# Patient Record
Sex: MALE | Race: WHITE | NOT HISPANIC OR LATINO | Employment: FULL TIME | URBAN - NONMETROPOLITAN AREA
[De-identification: names, ages, dates, MRNs, and addresses within clinical notes are randomized per-mention and may not be internally consistent; named-entity substitution may affect disease eponyms.]

---

## 2023-01-17 ENCOUNTER — APPOINTMENT (EMERGENCY)
Dept: CT IMAGING | Facility: HOSPITAL | Age: 28
End: 2023-01-17

## 2023-01-17 ENCOUNTER — HOSPITAL ENCOUNTER (EMERGENCY)
Facility: HOSPITAL | Age: 28
Discharge: HOME | End: 2023-01-17
Attending: EMERGENCY MEDICINE

## 2023-01-17 ENCOUNTER — APPOINTMENT (EMERGENCY)
Dept: ULTRASOUND IMAGING | Facility: HOSPITAL | Age: 28
End: 2023-01-17

## 2023-01-17 VITALS
TEMPERATURE: 97.5 F | HEIGHT: 72 IN | WEIGHT: 274 LBS | BODY MASS INDEX: 37.11 KG/M2 | RESPIRATION RATE: 18 BRPM | OXYGEN SATURATION: 98 % | SYSTOLIC BLOOD PRESSURE: 131 MMHG | HEART RATE: 76 BPM | DIASTOLIC BLOOD PRESSURE: 76 MMHG

## 2023-01-17 DIAGNOSIS — R79.0 LOW MAGNESIUM LEVEL: ICD-10-CM

## 2023-01-17 DIAGNOSIS — R10.9 FLANK PAIN: ICD-10-CM

## 2023-01-17 DIAGNOSIS — K80.20 GALLSTONES: ICD-10-CM

## 2023-01-17 DIAGNOSIS — U07.1 COVID-19 VIRUS INFECTION: Primary | ICD-10-CM

## 2023-01-17 LAB
ALBUMIN SERPL BCP-MCNC: 4.2 G/DL (ref 3.5–5)
ALP SERPL-CCNC: 71 U/L (ref 34–104)
ALT SERPL W P-5'-P-CCNC: 41 U/L (ref 7–52)
ANION GAP SERPL CALCULATED.3IONS-SCNC: 8 MMOL/L (ref 4–13)
AST SERPL W P-5'-P-CCNC: 25 U/L (ref 13–39)
BASOPHILS # BLD AUTO: 0.02 THOUSANDS/ÂΜL (ref 0–0.1)
BASOPHILS NFR BLD AUTO: 1 % (ref 0–1)
BILIRUB DIRECT SERPL-MCNC: 0.12 MG/DL (ref 0–0.2)
BILIRUB SERPL-MCNC: 0.4 MG/DL (ref 0.2–1)
BILIRUB UR QL STRIP: NEGATIVE
BUN SERPL-MCNC: 12 MG/DL (ref 5–25)
CALCIUM SERPL-MCNC: 8.8 MG/DL (ref 8.4–10.2)
CHLORIDE SERPL-SCNC: 104 MMOL/L (ref 96–108)
CLARITY UR: CLEAR
CO2 SERPL-SCNC: 28 MMOL/L (ref 21–32)
COLOR UR: YELLOW
CREAT SERPL-MCNC: 0.93 MG/DL (ref 0.6–1.3)
EOSINOPHIL # BLD AUTO: 0.05 THOUSAND/ÂΜL (ref 0–0.61)
EOSINOPHIL NFR BLD AUTO: 1 % (ref 0–6)
ERYTHROCYTE [DISTWIDTH] IN BLOOD BY AUTOMATED COUNT: 12 % (ref 11.6–15.1)
FLUAV RNA RESP QL NAA+PROBE: NEGATIVE
FLUBV RNA RESP QL NAA+PROBE: NEGATIVE
GFR SERPL CREATININE-BSD FRML MDRD: 112 ML/MIN/1.73SQ M
GLUCOSE SERPL-MCNC: 87 MG/DL (ref 65–140)
GLUCOSE UR STRIP-MCNC: NEGATIVE MG/DL
HCT VFR BLD AUTO: 46.7 % (ref 36.5–49.3)
HGB BLD-MCNC: 16.1 G/DL (ref 12–17)
HGB UR QL STRIP.AUTO: NEGATIVE
IMM GRANULOCYTES # BLD AUTO: 0.01 THOUSAND/UL (ref 0–0.2)
IMM GRANULOCYTES NFR BLD AUTO: 0 % (ref 0–2)
KETONES UR STRIP-MCNC: NEGATIVE MG/DL
LACTATE SERPL-SCNC: 0.7 MMOL/L (ref 0.5–2)
LEUKOCYTE ESTERASE UR QL STRIP: NEGATIVE
LIPASE SERPL-CCNC: 17 U/L (ref 11–82)
LYMPHOCYTES # BLD AUTO: 1.46 THOUSANDS/ÂΜL (ref 0.6–4.47)
LYMPHOCYTES NFR BLD AUTO: 40 % (ref 14–44)
MAGNESIUM SERPL-MCNC: 1.8 MG/DL (ref 1.9–2.7)
MCH RBC QN AUTO: 31.1 PG (ref 26.8–34.3)
MCHC RBC AUTO-ENTMCNC: 34.5 G/DL (ref 31.4–37.4)
MCV RBC AUTO: 90 FL (ref 82–98)
MONOCYTES # BLD AUTO: 0.41 THOUSAND/ÂΜL (ref 0.17–1.22)
MONOCYTES NFR BLD AUTO: 11 % (ref 4–12)
NEUTROPHILS # BLD AUTO: 1.75 THOUSANDS/ÂΜL (ref 1.85–7.62)
NEUTS SEG NFR BLD AUTO: 47 % (ref 43–75)
NITRITE UR QL STRIP: NEGATIVE
NRBC BLD AUTO-RTO: 0 /100 WBCS
PH UR STRIP.AUTO: 6 [PH]
PLATELET # BLD AUTO: 168 THOUSANDS/UL (ref 149–390)
PMV BLD AUTO: 10.4 FL (ref 8.9–12.7)
POTASSIUM SERPL-SCNC: 3.7 MMOL/L (ref 3.5–5.3)
PROCALCITONIN SERPL-MCNC: <0.05 NG/ML
PROT SERPL-MCNC: 6.7 G/DL (ref 6.4–8.4)
PROT UR STRIP-MCNC: NEGATIVE MG/DL
RBC # BLD AUTO: 5.18 MILLION/UL (ref 3.88–5.62)
RSV RNA RESP QL NAA+PROBE: NEGATIVE
SARS-COV-2 RNA RESP QL NAA+PROBE: POSITIVE
SODIUM SERPL-SCNC: 140 MMOL/L (ref 135–147)
SP GR UR STRIP.AUTO: <=1.005 (ref 1–1.03)
UROBILINOGEN UR QL STRIP.AUTO: 0.2 E.U./DL
WBC # BLD AUTO: 3.7 THOUSAND/UL (ref 4.31–10.16)

## 2023-01-17 RX ORDER — MAGNESIUM OXIDE 400 MG/1
400 TABLET ORAL DAILY
Qty: 20 TABLET | Refills: 0 | Status: SHIPPED | OUTPATIENT
Start: 2023-01-17 | End: 2023-02-06

## 2023-01-17 RX ORDER — ONDANSETRON 4 MG/1
4 TABLET, ORALLY DISINTEGRATING ORAL EVERY 8 HOURS PRN
Qty: 20 TABLET | Refills: 0 | Status: SHIPPED | OUTPATIENT
Start: 2023-01-17

## 2023-01-17 RX ORDER — NIRMATRELVIR AND RITONAVIR 300-100 MG
3 KIT ORAL 2 TIMES DAILY
Qty: 30 TABLET | Refills: 0 | Status: SHIPPED | OUTPATIENT
Start: 2023-01-17 | End: 2023-01-22

## 2023-01-17 RX ORDER — ONDANSETRON 2 MG/ML
4 INJECTION INTRAMUSCULAR; INTRAVENOUS ONCE
Status: DISCONTINUED | OUTPATIENT
Start: 2023-01-17 | End: 2023-01-18 | Stop reason: HOSPADM

## 2023-01-17 RX ADMIN — IOHEXOL 100 ML: 350 INJECTION, SOLUTION INTRAVENOUS at 20:56

## 2023-01-17 RX ADMIN — SODIUM CHLORIDE, SODIUM LACTATE, POTASSIUM CHLORIDE, AND CALCIUM CHLORIDE 1000 ML: .6; .31; .03; .02 INJECTION, SOLUTION INTRAVENOUS at 19:53

## 2023-01-17 NOTE — Clinical Note
Ronaldo Solitario was seen and treated in our emergency department on 1/17/2023  Diagnosis:     Dayron Hooper  may return to work on return date  He may return on this date: 01/23/2023         If you have any questions or concerns, please don't hesitate to call        Myles Calhoun MD    ______________________________           _______________          _______________  Hospital Representative                              Date                                Time

## 2023-01-17 NOTE — Clinical Note
Alexandre Palomino was seen and treated in our emergency department on 1/17/2023  Diagnosis:     Eduar Johnson  may return to work on return date  He may return on this date: 01/23/2023         If you have any questions or concerns, please don't hesitate to call        Radha Mcgregor MD    ______________________________           _______________          _______________  Hospital Representative                              Date                                Time

## 2023-01-18 NOTE — ED PROVIDER NOTES
History  Chief Complaint   Patient presents with   • Flank Pain     Patient reports b/l flank pain beginning yesterday with left being worse  Currently taking antibiotic due to tooth extraction  Also reports nausea and very dark stool  Denies any abdominal pain     32year-old otherwise healthy o/w male presents with left greater than right flank pain which is reminiscent of bilateral pyelonephritis which required hospitalization approximately 3 years ago  Denies any urinary complaints such as hematuria frequency dysuria urgency or gross hematuria has no prior history of kidney stones  Patient states symptoms darted 2 days ago was accompanied by feeling hot and cold sweating and freezing at the same time but no documented fever diffuse myalgias runny nose allergies all over since localized to the left flank region he denies chest pain shortness of breath or pleuritic pain no prior history of PE or DVT  Right-sided CVA tenderness has resolved and persists on the left denies lightheadedness he has been on antibiotic amoxicillin for a tooth extraction denies facial swelling difficulty swallowing or voice change no oral bleeding  There is been no sick contacts  He did test for COVID twice and that was positive no testicular pain or swelling or history of hernias no abdominal pain more fatigued and took naps which is unusual for him  Noted some darker type stools been a little bit runny  None       History reviewed  No pertinent past medical history  Past Surgical History:   Procedure Laterality Date   • TOOTH EXTRACTION         History reviewed  No pertinent family history  I have reviewed and agree with the history as documented      E-Cigarette/Vaping     E-Cigarette/Vaping Substances     Social History     Tobacco Use   • Smoking status: Every Day     Packs/day: 0 50     Types: Cigarettes   • Smokeless tobacco: Never   Substance Use Topics   • Alcohol use: Never   • Drug use: Never       Review of Systems   Constitutional: Positive for activity change, appetite change, chills and fatigue  Negative for fever  HENT: Negative for congestion, dental problem, ear pain, rhinorrhea, sneezing, sore throat and trouble swallowing  Eyes: Negative for discharge  Respiratory: Negative for cough and shortness of breath  Cardiovascular: Negative for chest pain and leg swelling  Gastrointestinal: Positive for abdominal pain, diarrhea and nausea  Negative for anal bleeding, blood in stool and vomiting  Endocrine: Negative for polyuria  Genitourinary: Positive for flank pain  Negative for decreased urine volume, difficulty urinating, dysuria, frequency, hematuria, penile discharge, penile pain, testicular pain and urgency  Musculoskeletal: Positive for myalgias  Negative for back pain  Skin: Negative for rash  Neurological: Negative for dizziness, weakness, light-headedness, numbness and headaches  Hematological: Negative for adenopathy  Psychiatric/Behavioral: Negative for confusion  All other systems reviewed and are negative  Physical Exam  Physical Exam  Vitals and nursing note reviewed  Constitutional:       General: He is not in acute distress  Appearance: He is not ill-appearing, toxic-appearing or diaphoretic  HENT:      Head: Normocephalic  Right Ear: Tympanic membrane normal  There is no impacted cerumen  Left Ear: Tympanic membrane normal  There is no impacted cerumen  Nose: No congestion or rhinorrhea  Mouth/Throat:      Mouth: Mucous membranes are moist       Pharynx: No oropharyngeal exudate  Comments: Left posterior mandibular molar was extracted there is no evidence of dry socket or any inflammation there is no submandibular tenderness no trismus facial tenderness cellulitis or induration  Eyes:      General:         Right eye: No discharge  Left eye: No discharge  Extraocular Movements: Extraocular movements intact  Conjunctiva/sclera: Conjunctivae normal       Pupils: Pupils are equal, round, and reactive to light  Cardiovascular:      Rate and Rhythm: Normal rate and regular rhythm  Pulses: Normal pulses  Pulmonary:      Effort: Pulmonary effort is normal  No respiratory distress  Breath sounds: No stridor  No wheezing, rhonchi or rales  Chest:      Chest wall: No tenderness  Abdominal:      General: Bowel sounds are normal  There is no distension  Tenderness: There is no abdominal tenderness  There is no right CVA tenderness, left CVA tenderness, guarding or rebound  Comments: Points to left flank as to the area of discomfort but he has no direct CVA tenderness   Genitourinary:     Comments: Patient defers rectal exam  Musculoskeletal:      Cervical back: Normal range of motion and neck supple  No rigidity or tenderness  Lymphadenopathy:      Cervical: No cervical adenopathy  Skin:     General: Skin is warm and dry  Capillary Refill: Capillary refill takes less than 2 seconds  Findings: No rash  Neurological:      General: No focal deficit present  Mental Status: He is alert and oriented to person, place, and time  Cranial Nerves: No cranial nerve deficit  Sensory: No sensory deficit  Motor: No weakness        Coordination: Coordination normal       Gait: Gait normal    Psychiatric:         Mood and Affect: Mood normal          Vital Signs  ED Triage Vitals   Temperature Pulse Respirations Blood Pressure SpO2   01/17/23 1954 01/17/23 1826 01/17/23 1826 01/17/23 1826 01/17/23 1826   97 5 °F (36 4 °C) 76 18 131/76 98 %      Temp Source Heart Rate Source Patient Position - Orthostatic VS BP Location FiO2 (%)   01/17/23 1954 01/17/23 1826 01/17/23 1826 01/17/23 1826 --   Temporal Monitor Sitting Left arm       Pain Score       01/17/23 1826       8           Vitals:    01/17/23 1826   BP: 131/76   Pulse: 76   Patient Position - Orthostatic VS: Sitting         Visual Acuity      ED Medications  Medications   ondansetron (ZOFRAN) injection 4 mg (0 mg Intravenous Hold 1/17/23 1955)   lactated ringers bolus 1,000 mL (0 mL Intravenous Stopped 1/17/23 2040)   iohexol (OMNIPAQUE) 350 MG/ML injection (SINGLE-DOSE) 100 mL (100 mL Intravenous Given 1/17/23 2056)       Diagnostic Studies  Results Reviewed     Procedure Component Value Units Date/Time    FLU/RSV/COVID - if FLU/RSV clinically relevant [[id number]]  (Abnormal) Collected: 01/17/23 1956    Lab Status: Final result Specimen: Nares from Nose Updated: 01/17/23 2052     SARS-CoV-2 Positive     INFLUENZA A PCR Negative     INFLUENZA B PCR Negative     RSV PCR Negative    Narrative:      FOR PEDIATRIC PATIENTS - copy/paste COVID Guidelines URL to browser: [email or web address]    SARS-CoV-2 assay is a Nucleic Acid Amplification assay intended for the  qualitative detection of nucleic acid from SARS-CoV-2 in nasopharyngeal  swabs  Results are for the presumptive identification of SARS-CoV-2 RNA  Positive results are indicative of infection with SARS-CoV-2, the virus  causing COVID-19, but do not rule out bacterial infection or co-infection  with other viruses  Laboratories within the [place] and its  territories are required to report all positive results to the appropriate  public health authorities  Negative results do not preclude SARS-CoV-2  infection and should not be used as the sole basis for treatment or other  patient management decisions  Negative results must be combined with  clinical observations, patient history, and epidemiological information  This test has not been FDA cleared or approved  This test has been authorized by FDA under an Emergency Use Authorization  (EUA)   This test is only authorized for the duration of time the  declaration that circumstances exist justifying the authorization of the  emergency use of an in vitro diagnostic tests for detection of SARS-CoV-2  virus and/or diagnosis of COVID-19 infection under section 564(b)(1) of  the Act, 21 U  S C  586YZB-5(U)(5), unless the authorization is terminated  or revoked sooner  The test has been validated but independent review by FDA  and CLIA is pending  Test performed using [employer] GeneXpert: This RT-PCR assay targets N2,  a region unique to SARS-CoV-2  A conserved region in the E-gene was chosen  for pan-Sarbecovirus detection which includes SARS-CoV-2  According to CMS-2020-01-R, this platform meets the definition of high-throughput technology  Procalcitonin [[id number]]  (Normal) Collected: 01/17/23 1956    Lab Status: Final result Specimen: Blood from Arm, Left Updated: 01/17/23 2038     Procalcitonin <0 05 ng/ml     Lactic acid [[id number]]  (Normal) Collected: 01/17/23 1956    Lab Status: Final result Specimen: Blood from Arm, Left Updated: 01/17/23 2030     LACTIC ACID 0 7 mmol/L     Narrative:      Result may be elevated if tourniquet was used during collection      Basic metabolic panel [[id number]] Collected: 01/17/23 1956    Lab Status: Final result Specimen: Blood from Arm, Left Updated: 01/17/23 2030     Sodium 140 mmol/L      Potassium 3 7 mmol/L      Chloride 104 mmol/L      CO2 28 mmol/L      ANION GAP 8 mmol/L      BUN 12 mg/dL      Creatinine 0 93 mg/dL      Glucose 87 mg/dL      Calcium 8 8 mg/dL      eGFR 112 ml/min/1 73sq m     Narrative:      [facility] guidelines for Chronic Kidney Disease (CKD):   •  Stage 1 with normal or high GFR (GFR > 90 mL/min/1 73 square meters)  •  Stage 2 Mild CKD (GFR = 60-89 mL/min/1 73 square meters)  •  Stage 3A Moderate CKD (GFR = 45-59 mL/min/1 73 square meters)  •  Stage 3B Moderate CKD (GFR = 30-44 mL/min/1 73 square meters)  •  Stage 4 Severe CKD (GFR = 15-29 mL/min/1 73 square meters)  •  Stage 5 End Stage CKD (GFR <15 mL/min/1 73 square meters)  Note: GFR calculation is accurate only with a steady state creatinine    Hepatic function panel [[id number]]  (Normal) Collected: 01/17/23 1956    Lab Status: Final result Specimen: Blood from Arm, Left Updated: 01/17/23 2030     Total Bilirubin 0 40 mg/dL      Bilirubin, Direct 0 12 mg/dL      Alkaline Phosphatase 71 U/L      AST 25 U/L      ALT 41 U/L      Total Protein 6 7 g/dL      Albumin 4 2 g/dL     Lipase [[id number]]  (Normal) Collected: 01/17/23 1956    Lab Status: Final result Specimen: Blood from Arm, Left Updated: 01/17/23 2030     Lipase 17 u/L     Magnesium [[id number]]  (Abnormal) Collected: 01/17/23 1956    Lab Status: Final result Specimen: Blood from Arm, Left Updated: 01/17/23 2030     Magnesium 1 8 mg/dL     UA w Reflex to Microscopic w Reflex to Culture [[id number]] Collected: 01/17/23 1956    Lab Status: Final result Specimen: Urine, Clean Catch Updated: 01/17/23 2013     Color, UA Yellow     Clarity, UA Clear     Specific Gravity, UA <=1 005     pH, UA 6 0     Leukocytes, UA Negative     Nitrite, UA Negative     Protein, UA Negative mg/dl      Glucose, UA Negative mg/dl      Ketones, UA Negative mg/dl      Urobilinogen, UA 0 2 E U /dl      Bilirubin, UA Negative     Occult Blood, UA Negative    CBC and differential [[id number]]  (Abnormal) Collected: 01/17/23 1956    Lab Status: Final result Specimen: Blood from Arm, Left Updated: 01/17/23 2004     WBC 3 70 Thousand/uL      RBC 5 18 Million/uL      Hemoglobin 16 1 g/dL      Hematocrit 46 7 %      MCV 90 fL      MCH 31 1 pg      MCHC 34 5 g/dL      RDW 12 0 %      MPV 10 4 fL      Platelets 664 Thousands/uL      nRBC 0 /100 WBCs      Neutrophils Relative 47 %      Immat GRANS % 0 %      Lymphocytes Relative 40 %      Monocytes Relative 11 %      Eosinophils Relative 1 %      Basophils Relative 1 %      Neutrophils Absolute 1 75 Thousands/µL      Immature Grans Absolute 0 01 Thousand/uL      Lymphocytes Absolute 1 46 Thousands/µL      Monocytes Absolute 0 41 Thousand/µL      Eosinophils Absolute 0 05 Thousand/µL      Basophils Absolute 0 02 Thousands/µL                  US right upper quadrant   Final Result by Gabby Andres DO (01/17 2327)      Cholelithiasis without sonographic evidence of acute cholecystitis  Workstation performed: [id number]         CT abdomen pelvis with contrast   Final Result by Gabby Andres DO (01/17 2159)      Cholelithiasis with gallbladder wall thickening which may represent acute cholecystitis  Recommend right upper quadrant sonogram for further evaluation  Immediate            Workstation performed: [id number]                    Procedures  Procedures         ED Course  ED Course as of 01/18/23 0022   Tue Jan 17, 2023 2224 Secondary hx of smoking offered paxlovid; discussed no medication interaction as only on amoxicillin  Considered an experimental drug with risk of                                              Medical Decision Making  Mdm: Patient presents with more of a viral syndrome type picture but does have diarrhea patient is deferring rectal exam; will proceed with CT abdomen pelvis to assess kidneys for any hydronephrosis stranding; u/a; enteries dermatic management with antiemetics and IV fluids COVID influenza RSV swab reevaluate    RUQ u/s negative for cholecystitis  Recommend general surgery follow-up as an outpatient after he is over the COVID in approximately a week and a half  Recommended a low-fat diet so as not to trigger the gallbladder  COVID-19 virus infection: acute illness or injury  Flank pain: acute illness or injury  Gallstones: acute illness or injury  Low magnesium level: acute illness or injury  Amount and/or Complexity of Data Reviewed  Labs: ordered  Radiology: ordered  Risk  OTC drugs  Prescription drug management            Disposition  Final diagnoses:   Low magnesium level   COVID-19 virus infection   Flank pain - left greater than right   Gallstones     Time reflects when diagnosis was documented in both MDM as applicable and the Disposition within this note     Time User Action Codes Description Comment    1/17/2023 10:29 PM Wyn Freeze Add [R79 0] Low magnesium level     1/17/2023 10:29 PM Wyn Freeze Add [U07 1] COVID-19 virus infection     1/17/2023 10:29 PM Ashely Bautista Modify [R79 0] Low magnesium level     1/17/2023 10:29 PM Ashely Bautista Modify [U07 1] COVID-19 virus infection     1/17/2023 10:29 PM Ashely Bautista Add [R10 9] Flank pain     1/17/2023 10:29 PM Wyn Freeze Modify [R10 9] Flank pain left greater than right    1/17/2023 10:30 PM Wyn Freeze Add [K80 20] Gallstones       ED Disposition     ED Disposition   Discharge    Condition   Stable    Date/Time   Tue Jan 17, 2023 11:35 PM    Comment   Rashmi Roqueer discharge to home/self care                 Follow-up Information     Follow up With Specialties Details Why Contact Info Additional Information    Salvatore Ivory MD Internal Medicine Call in 1 day tele visits with covid [facility]  [phone]       [facility] [place] [facility] Call in 1 day follow up of your gallstones [address] [address]  [address]  [address]orectal[address], [facility]          Discharge Medication List as of 1/17/2023 11:43 PM      START taking these medications    Details   magnesium oxide (MAG-OX) 400 mg tablet Take 1 tablet (400 mg total) by mouth daily for 20 days, Starting Tue 1/17/2023, Until Mon 2/6/2023, Print      nirmatrelvir & ritonavir (Paxlovid, 300/100,) tablet therapy pack Take 3 tablets by mouth 2 (two) times a day for 5 days Take 2 nirmatrelvir tablets + 1 ritonavir tablet together per dose, Starting Tue 1/17/2023, Until Sun 1/22/2023, Print      ondansetron (ZOFRAN-ODT) 4 mg disintegrating tablet Take 1 tablet (4 mg total) by mouth every 8 (eight) hours as needed for nausea or vomiting for up to 20 doses, Starting Tue 1/17/2023, Print             No discharge procedures on file      PDMP Review     None          ED Provider  Electronically Signed by           Basilio Jacques MD  01/18/23 3759

## 2023-01-18 NOTE — DISCHARGE INSTRUCTIONS
Tylenol 650mg every 6 hours as needed for pain, fever (max 3000mg in 24 hours)   Aleve 2 tabs twice daily with food OR ibuprofen 200-800mg every 8 hours with food as needed for pain   Plenty of fluids  Zofran as needed for nausea  Vitamin D3 2000 units daily for 1 week  Supplement magnesium  With gallstones recommend low fat diet  no fried foods, fatty foods cheese   and follow up with general surgery in approx 8 days (5 days of self isolation and 5 days masking around others)  Return with shortness of breath, inability to keep fluids down, abdominal pain lightheaded or any new or worsening symptoms

## 2024-04-12 ENCOUNTER — OFFICE VISIT (OUTPATIENT)
Dept: URGENT CARE | Facility: CLINIC | Age: 29
End: 2024-04-12
Payer: COMMERCIAL

## 2024-04-12 VITALS
RESPIRATION RATE: 19 BRPM | OXYGEN SATURATION: 98 % | SYSTOLIC BLOOD PRESSURE: 132 MMHG | TEMPERATURE: 97.8 F | DIASTOLIC BLOOD PRESSURE: 62 MMHG | HEART RATE: 88 BPM

## 2024-04-12 DIAGNOSIS — M54.9 UPPER BACK PAIN: ICD-10-CM

## 2024-04-12 DIAGNOSIS — R07.9 CHEST PAIN, UNSPECIFIED TYPE: Primary | ICD-10-CM

## 2024-04-12 LAB — GLUCOSE SERPL-MCNC: 114 MG/DL (ref 65–140)

## 2024-04-12 PROCEDURE — G0382 LEV 3 HOSP TYPE B ED VISIT: HCPCS

## 2024-04-12 PROCEDURE — 82948 REAGENT STRIP/BLOOD GLUCOSE: CPT

## 2024-04-12 NOTE — PROGRESS NOTES
[facility]        NAME: Elian Morales is a 29 y.o. male  : 1995    MRN: [id number]  DATE: 2024  TIME: 10:31 AM    Assessment and Plan   Chest pain, unspecified type [R07.9]  1. Chest pain, unspecified type  ECG 12 lead    Transfer to other facility      2. Upper back pain  Transfer to other facility        Sudden mid chest, upper back, generalized abdominal discomfort that began at 6 AM this morning.  Was recently ill with upper respiratory congestion and slight cough last night.  Chest pain/back pain not reproduced with cough or deep breathing/palpation.  Patient appears to be in slight discomfort due to pain.  EKG revealed normal sinus rhythm at 63 bpm without any acute ST segment changes.  No prior cardiac history (did have a childhood murmur that resolved when he was 16, has not followed up with cardiology since).  No identifying risk factors.  Fingerstick glucose 114 here in the clinic.  Sending to the emergency room for further evaluation and workup.    Patient Instructions     Go to the emergency room as instructed.  Tylenol or ibuprofen for pain or fever.  Make sure to drink plenty fluids and rest.  If any symptoms worsen such as worsening of pain, shortness of breath, weakness, fatigue-call 911 or go to the emergency room immediately.  Follow up with PCP in 3-5 days.  Proceed to  ER if symptoms worsen.    If tests are performed, our office will contact you with results only if changes need to made to the care plan discussed with you at the visit. You can review your full results on [facility].    Chief Complaint     Chief Complaint   Patient presents with    Back Pain    Chest Pain     Started at 0600 today   Chest pain does not radiate down either arm  No OTC medication  Blood sugar 114         History of Present Illness       29-year-old male presents the clinic with mid central chest pain and mid upper back pain that began around 6 AM this morning when he woke up.  The  pain does not radiate to the jaw or arms bilaterally.  No prior cardiac history.  Did not take any over-the-counter medications for symptoms.  Denies any recent illness involving a cough.  Denies any fall or trauma.  Denies any new exercise or heavy lifting.  He denies any fever, chills, ear pain, sore throat, congestion, difficulty breathing/shortness of breath, diaphoresis, syncope, weakness, headache, dizziness, numbness/tingling, nausea, vomiting, diarrhea, abdominal pain.    Back Pain  Associated symptoms include abdominal pain and chest pain. Pertinent negatives include no fever.   Chest Pain   Associated symptoms include abdominal pain, back pain (upper mid) and diaphoresis. Pertinent negatives include no cough, fever, nausea, shortness of breath or vomiting.       Review of Systems   Review of Systems   Constitutional:  Positive for diaphoresis. Negative for appetite change, chills, fatigue and fever.   HENT: Negative.     Eyes: Negative.    Respiratory:  Negative for cough, shortness of breath and wheezing.    Cardiovascular:  Positive for chest pain.   Gastrointestinal:  Positive for abdominal pain. Negative for abdominal distention, blood in stool, constipation, diarrhea, nausea, rectal pain and vomiting.   Endocrine: Negative.    Genitourinary: Negative.    Musculoskeletal:  Positive for back pain (upper mid).   Skin: Negative.    Neurological: Negative.          Current Medications       Current Outpatient Medications:     ondansetron (ZOFRAN-ODT) 4 mg disintegrating tablet, Take 1 tablet (4 mg total) by mouth every 8 (eight) hours as needed for nausea or vomiting for up to 20 doses (Patient not taking: Reported on 4/12/2024), Disp: 20 tablet, Rfl: 0    Current Allergies     Allergies as of 04/12/2024    (No Known Allergies)            The following portions of the patient's history were reviewed and updated as appropriate: allergies, current medications, past family history, past medical history, past  social history, past surgical history and problem list.     Past Medical History:   Diagnosis Date    Heart murmur        Past Surgical History:   Procedure Laterality Date    TOOTH EXTRACTION         No family history on file.      Medications have been verified.        Objective   /62   Pulse 88   Temp 97.8 °F (36.6 °C)   Resp 19   SpO2 98%        Physical Exam     Physical Exam  Vitals and nursing note reviewed.   Constitutional:       General: He is awake. He is not in acute distress.     Appearance: Normal appearance. He is not ill-appearing, toxic-appearing or diaphoretic.      Comments: Patient appears in slight discomfort due to mid back/chest/abdominal discomfort.  Answering questions appropriately following commands.   HENT:      Head: Normocephalic and atraumatic.      Right Ear: Tympanic membrane, ear canal and external ear normal.      Left Ear: Tympanic membrane, ear canal and external ear normal.      Nose: Nose normal.      Mouth/Throat:      Mouth: Mucous membranes are moist.      Pharynx: Oropharynx is clear. No oropharyngeal exudate or posterior oropharyngeal erythema.   Eyes:      General: No visual field deficit.     Extraocular Movements: Extraocular movements intact.      Conjunctiva/sclera: Conjunctivae normal.      Pupils: Pupils are equal, round, and reactive to light.   Cardiovascular:      Rate and Rhythm: Normal rate and regular rhythm.      Pulses: Normal pulses. No decreased pulses.      Heart sounds: Normal heart sounds, S1 normal and S2 normal. Heart sounds not distant. No murmur heard.     No friction rub. No gallop.   Pulmonary:      Effort: Pulmonary effort is normal. No tachypnea, accessory muscle usage or respiratory distress.      Breath sounds: Normal breath sounds. No stridor. No wheezing, rhonchi or rales.   Chest:      Chest wall: No tenderness.   Abdominal:      General: Abdomen is flat. Bowel sounds are normal. There is no distension. There are no signs of  injury.      Palpations: Abdomen is soft. There is no shifting dullness, fluid wave or mass.      Tenderness: There is generalized abdominal tenderness. There is no guarding or rebound. Negative signs include Estrada's sign, Rovsing's sign and McBurney's sign.   Musculoskeletal:      Cervical back: Normal range of motion and neck supple. No rigidity or tenderness.      Right lower leg: No edema.      Left lower leg: No edema.   Lymphadenopathy:      Cervical: No cervical adenopathy.   Skin:     General: Skin is warm and dry.      Capillary Refill: Capillary refill takes less than 2 seconds.      Coloration: Skin is not pale.      Findings: No erythema or rash.   Neurological:      General: No focal deficit present.      Mental Status: He is alert, oriented to person, place, and time and easily aroused.      GCS: GCS eye subscore is 4. GCS verbal subscore is 5. GCS motor subscore is 6.      Cranial Nerves: Cranial nerves 2-12 are intact. No cranial nerve deficit, dysarthria or facial asymmetry.      Sensory: Sensation is intact. No sensory deficit.      Motor: Motor function is intact. No weakness or pronator drift.      Coordination: Coordination is intact. Coordination normal. Finger-Nose-Finger Test and Heel to Shin Test normal.      Gait: Gait is intact. Gait normal.      Deep Tendon Reflexes: Reflexes are normal and symmetric. Reflexes normal.   Psychiatric:         Mood and Affect: Mood normal. Mood is not anxious.         Behavior: Behavior normal. Behavior is not agitated. Behavior is cooperative.         Thought Content: Thought content normal.

## 2024-04-12 NOTE — PATIENT INSTRUCTIONS
Go to the emergency room as instructed.  Tylenol or ibuprofen for pain or fever.  Make sure to drink plenty fluids and rest.  If any symptoms worsen such as worsening of pain, shortness of breath, weakness, fatigue-call 911 or go to the emergency room immediately.  Follow up with PCP in 3-5 days.  Proceed to  ER if symptoms worsen.    If tests are performed, our office will contact you with results only if changes need to made to the care plan discussed with you at the visit. You can review your full results on [facility] Mychart.  Chest Pain   WHAT YOU NEED TO KNOW:   Chest pain can be caused by a range of conditions, from not serious to life-threatening. Chest pain can be a symptom of a digestive problem, such as acid reflux or a stomach ulcer. An anxiety attack or a strong emotion, such as anger, can also cause chest pain. Infection, inflammation, or a fracture in the bones or cartilage in your chest can cause pain or discomfort. Sometimes chest pain or pressure is caused by poor blood flow to your heart (angina). Chest pain may also be caused by life-threatening conditions such as a heart attack or blood clot in your lungs.  DISCHARGE INSTRUCTIONS:   Call your local emergency number (911 in the [place]) or have someone call if:   You have any of the following signs of a heart attack:      Squeezing, pressure, or pain in your chest    You may  also have any of the following:     Discomfort or pain in your back, neck, jaw, stomach, or arm    Shortness of breath    Nausea or vomiting    Lightheadedness or a sudden cold sweat      Return to the emergency department if:   You have chest discomfort that gets worse, even with medicine.    You cough or vomit blood.    Your bowel movements are black or bloody.    You cannot stop vomiting, or it hurts to swallow.    Call your doctor if:   You have questions or concerns about your condition or care.      Medicines:   Medicines  may be given to treat the cause of your chest  pain. Examples include pain medicine, anxiety medicine, or medicines to increase blood flow to your heart.    Do not take certain medicines without asking your healthcare provider first.  These include NSAIDs, herbal or vitamin supplements, and hormones, such as estrogen or progestin.    Take your medicine as directed.  Contact your healthcare provider if you think your medicine is not helping or if you have side effects. Tell your provider if you are allergic to any medicine. Keep a list of the medicines, vitamins, and herbs you take. Include the amounts, and when and why you take them. Bring the list or the pill bottles to follow-up visits. Carry your medicine list with you in case of an emergency.    Healthy living tips:  If the cause of your chest pain is known, your healthcare provider will give you specific guidelines to follow. The following are general healthy guidelines:  Do not smoke.  Nicotine and other chemicals in cigarettes and cigars can cause lung and heart damage. Ask your healthcare provider for information if you currently smoke and need help to quit. E-cigarettes or smokeless tobacco still contain nicotine. Talk to your healthcare provider before you use these products.    Choose a variety of healthy foods as often as possible.  Include fresh, frozen, or canned fruits and vegetables. Also include low-fat dairy products, fish, chicken (without skin), and lean meats. Your healthcare provider or a dietitian can help you create meal plans. You may need to avoid certain foods or drinks if your pain is caused by a digestion problem.         Lower your sodium (salt) intake.  Limit foods that are high in sodium, such as canned foods, salty snacks, and cold cuts. If you add salt when you cook food, do not add more at the table. Choose low-sodium canned foods as much as possible.         Drink plenty of water every day.  Water helps your body to control your temperature and blood pressure. Ask your  healthcare provider how much water you should drink every day.    Ask about activity.  Your healthcare provider will tell you which activities to limit or avoid. Ask when you can drive, return to work, and have sex. Ask about the best exercise plan for you.    Maintain a healthy weight.  Ask your healthcare provider what a healthy weight is for you. Ask him or her to help you create a safe weight loss plan if you are overweight.    Ask about vaccines you may need.  Your healthcare provider can tell you which vaccines you need, and when to get them. The following vaccines help prevent diseases that can become serious for a person with a heart condition:    The influenza (flu) vaccine is given each year.  Get a flu vaccine as soon as recommended, usually in September or October.    The pneumonia vaccine is usually given every 5 years.  Your healthcare provider may recommend the pneumonia vaccine if you are 65 or older.    COVID-19 vaccines are given to adults as a shot in 1 or 2 doses.  Vaccination is recommended for all adults. A booster (additional) dose is also recommended for all adults. A second booster is recommended for all adults 50 or older and for immunocompromised adults 18 or older. The second booster is also recommended for adults who received the 1-dose vaccine for the first and booster doses. Your healthcare provider can tell you when to get one or both boosters.       Follow up with your doctor within 72 hours, or as directed:  You may need to return for more tests to find the cause of your chest pain. You may be referred to a specialist, such as a cardiologist or gastroenterologist. Write down your questions so you remember to ask them during your visits.  © Copyright [employer] 2023 Information is for End User's use only and may not be sold, redistributed or otherwise used for commercial purposes.  The above information is an  only. It is not intended as medical advice for individual  conditions or treatments. Talk to your doctor, nurse or pharmacist before following any medical regimen to see if it is safe and effective for you.

## 2024-04-18 LAB
ATRIAL RATE: 63 BPM
P AXIS: 38 DEGREES
PR INTERVAL: 152 MS
QRS AXIS: 92 DEGREES
QRSD INTERVAL: 82 MS
QT INTERVAL: 420 MS
QTC INTERVAL: 429 MS
T WAVE AXIS: 57 DEGREES
VENTRICULAR RATE: 63 BPM

## 2024-11-13 ENCOUNTER — OFFICE VISIT (OUTPATIENT)
Dept: URGENT CARE | Facility: CLINIC | Age: 29
End: 2024-11-13
Payer: COMMERCIAL

## 2024-11-13 VITALS
WEIGHT: 306 LBS | BODY MASS INDEX: 41.45 KG/M2 | HEART RATE: 76 BPM | TEMPERATURE: 98.2 F | RESPIRATION RATE: 14 BRPM | DIASTOLIC BLOOD PRESSURE: 72 MMHG | HEIGHT: 72 IN | OXYGEN SATURATION: 98 % | SYSTOLIC BLOOD PRESSURE: 112 MMHG

## 2024-11-13 DIAGNOSIS — R05.1 ACUTE COUGH: ICD-10-CM

## 2024-11-13 DIAGNOSIS — H66.93 BILATERAL OTITIS MEDIA, UNSPECIFIED OTITIS MEDIA TYPE: ICD-10-CM

## 2024-11-13 DIAGNOSIS — J01.00 ACUTE MAXILLARY SINUSITIS, RECURRENCE NOT SPECIFIED: Primary | ICD-10-CM

## 2024-11-13 PROCEDURE — G0382 LEV 3 HOSP TYPE B ED VISIT: HCPCS

## 2024-11-13 RX ORDER — DEXTROMETHORPHAN HYDROBROMIDE AND PROMETHAZINE HYDROCHLORIDE 15; 6.25 MG/5ML; MG/5ML
5 SYRUP ORAL 4 TIMES DAILY PRN
Qty: 118 ML | Refills: 0 | Status: SHIPPED | OUTPATIENT
Start: 2024-11-13

## 2024-11-13 NOTE — PROGRESS NOTES
[facility]        NAME: Elian Morales is a 29 y.o. male  : 1995    MRN: [id number]  DATE: 2024  TIME: 12:35 PM    Assessment and Plan   Acute maxillary sinusitis, recurrence not specified [J01.00]  1. Acute maxillary sinusitis, recurrence not specified  amoxicillin-clavulanate (AUGMENTIN) 875-125 mg per tablet      2. Bilateral otitis media, unspecified otitis media type  amoxicillin-clavulanate (AUGMENTIN) 875-125 mg per tablet      3. Acute cough  promethazine-dextromethorphan (PHENERGAN-DM) 6.25-15 mg/5 mL oral syrup        2 weeks of bilateral ear pain/pressure and maxillary sinus pressure.  Bulging erythema both TMs.  Will start on Augmentin.  Also sending in Phenergan DM for cough.  Given advice for at home remedies.  Advised to follow-up with family doctor.  Advised to go to the emergency room if any symptoms worsen.    Patient Instructions     Take prescribed medication as instructed.  Eat yogurt with live and active cultures and/or take a probiotic at least 3 hours before or after antibiotic dose. Monitor stool for diarrhea and/or blood. If this occurs, contact primary care doctor ASAP.    Tylenol/ibuprofen for pain or fever.  Nasal saline rinses/Flonase over-the-counter for congestion and sinus pressure.  Nasal saline lavage.  Warm steamy shower may be helpful.  Follow up with PCP in 3-5 days.  Proceed to  ER if symptoms worsen.    If tests are performed, our office will contact you with results only if changes need to made to the care plan discussed with you at the visit. [facility]    Chief Complaint     Chief Complaint   Patient presents with    Earache     Started 2 weeks ago. Right ear feels completely blocked, ringing in ear. No improvement seen. OTC sudafed taken yesterday.     Cough    Nasal Congestion    sinus pressure         History of Present Illness       29-year-old male presents to the clinic for 2 weeks of upper  respiratory congestion, ear pressure/pain, cough.  Denies sick contacts.  Took Sudafed over-the-counter yesterday.  Denies frequent sinus issues.  Denies any fever, chills, sore throat, chest pain, shortness of breath.    Earache   Associated symptoms include coughing and rhinorrhea. Pertinent negatives include no sore throat.   Cough  Associated symptoms include ear pain and rhinorrhea. Pertinent negatives include no chills, fever, sore throat, shortness of breath or wheezing.       Review of Systems   Review of Systems   Constitutional: Negative.  Negative for chills, diaphoresis, fatigue and fever.   HENT:  Positive for congestion, ear pain, rhinorrhea and sinus pressure. Negative for sore throat.    Eyes: Negative.    Respiratory:  Positive for cough. Negative for shortness of breath and wheezing.    Cardiovascular: Negative.    Musculoskeletal: Negative.    Skin: Negative.    Neurological: Negative.          Current Medications       Current Outpatient Medications:     amoxicillin-clavulanate (AUGMENTIN) 875-125 mg per tablet, Take 1 tablet by mouth every 12 (twelve) hours for 7 days, Disp: 14 tablet, Rfl: 0    promethazine-dextromethorphan (PHENERGAN-DM) 6.25-15 mg/5 mL oral syrup, Take 5 mL by mouth 4 (four) times a day as needed for cough, Disp: 118 mL, Rfl: 0    ondansetron (ZOFRAN-ODT) 4 mg disintegrating tablet, Take 1 tablet (4 mg total) by mouth every 8 (eight) hours as needed for nausea or vomiting for up to 20 doses (Patient not taking: Reported on 11/13/2024), Disp: 20 tablet, Rfl: 0    Current Allergies     Allergies as of 11/13/2024    (No Known Allergies)            The following portions of the patient's history were reviewed and updated as appropriate: allergies, current medications, past family history, past medical history, past social history, past surgical history and problem list.     Past Medical History:   Diagnosis Date    Heart murmur        Past Surgical History:   Procedure Laterality  Date    EGD AND COLONOSCOPY      TOOTH EXTRACTION         Family History   Problem Relation Age of Onset    Other (vertigo) Mother     Diabetes Father     Cancer Father          Medications have been verified.        Objective   /72   Pulse 76   Temp 98.2 °F (36.8 °C)   Resp 14   Ht 6' (1.829 m)   Wt (!) 139 kg (306 lb)   SpO2 98%   BMI 41.50 kg/m²        Physical Exam     Physical Exam  Constitutional:       General: He is not in acute distress.     Appearance: Normal appearance. He is not ill-appearing, toxic-appearing or diaphoretic.   HENT:      Head: Normocephalic and atraumatic.      Right Ear: Ear canal and external ear normal. Tympanic membrane is erythematous and bulging.      Left Ear: Ear canal and external ear normal. Tympanic membrane is erythematous and bulging.      Nose: Congestion present.      Right Sinus: Maxillary sinus tenderness present.      Left Sinus: Maxillary sinus tenderness present.      Mouth/Throat:      Mouth: Mucous membranes are moist.      Pharynx: Oropharynx is clear. No oropharyngeal exudate or posterior oropharyngeal erythema.   Eyes:      Extraocular Movements: Extraocular movements intact.      Conjunctiva/sclera: Conjunctivae normal.      Pupils: Pupils are equal, round, and reactive to light.   Cardiovascular:      Rate and Rhythm: Normal rate and regular rhythm.      Pulses: Normal pulses.      Heart sounds: Normal heart sounds.   Pulmonary:      Effort: Pulmonary effort is normal. No respiratory distress.      Breath sounds: Normal breath sounds. No stridor. No wheezing, rhonchi or rales.   Musculoskeletal:         General: Normal range of motion.      Cervical back: Normal range of motion and neck supple. No tenderness.   Lymphadenopathy:      Cervical: No cervical adenopathy.   Skin:     General: Skin is warm.      Capillary Refill: Capillary refill takes less than 2 seconds.      Coloration: Skin is not pale.      Findings: No erythema or rash.    Neurological:      General: No focal deficit present.      Mental Status: He is alert and oriented to person, place, and time.   Psychiatric:         Mood and Affect: Mood normal.

## 2024-11-13 NOTE — PATIENT INSTRUCTIONS
"Take prescribed medication as instructed.  Eat yogurt with live and active cultures and/or take a probiotic at least 3 hours before or after antibiotic dose. Monitor stool for diarrhea and/or blood. If this occurs, contact primary care doctor ASAP.    Tylenol/ibuprofen for pain or fever.  Nasal saline rinses/Flonase over-the-counter for congestion and sinus pressure.  Nasal saline lavage.  Warm steamy shower may be helpful.  Follow up with PCP in 3-5 days.  Proceed to  ER if symptoms worsen.    If tests are performed, our office will contact you with results only if changes need to made to the care plan discussed with you at the visit. You can review your full results on [facility] Mychart.  Patient Education     Cough in adults   The Basics   Written by the doctors and editors at [facility]   What is a cough? -- A cough is an important reflex that helps clear out the body's airways. The airways include the windpipe, or \"trachea,\" and the bronchi, which are the tubes that carry air within the lungs. Coughing helps keep people from breathing things into the airways and lungs, which could cause problems (figure 1).  It is normal for people to cough once in a while. But sometimes, a cough is a symptom of an illness or condition.  Some coughs are called \"dry\" coughs, because they don't bring up mucus (phlegm). Other coughs are called \"wet\" or \"productive\" coughs, because they do bring up mucus. Some coughs are mild and don't cause serious problems. Other coughs are severe and can cause trouble breathing.  What causes a cough? -- In adults, common causes of a cough include:   Viral infections - These include the common cold, the flu, and COVID-19. Usually, a cough caused by a viral infection will only last for a week or 2, but sometimes, it can last longer.   Smoking cigarettes or vaping   Postnasal drip - Postnasal drip is when mucus from the nose drips down or flows along the back of the throat. Postnasal drip can happen " "when people have:   A cold   Allergies   A sinus infection   Lung conditions - Lung problems like asthma and chronic obstructive pulmonary disease (\"COPD\") can make it hard to breathe. COPD is usually caused by smoking.   Acid reflux - Acid reflux is when the acid that is normally in your stomach backs up into your esophagus (the tube that carries food from your mouth to your stomach).   A side effect from blood pressure medicines called \"ACE inhibitors\"  Will I need tests? -- Maybe. If you see a doctor for your cough, they will talk with you and do an exam. Based on your symptoms and other factors, they might decide that you need tests. These might include:   A swab from your inside of your nose - This can be tested for the virus that causes COVID-19.   A chest X-ray   Breathing tests - Breathing tests involve breathing hard into a tube. These tests show how your lungs are working.   Allergy skin tests to find out what you're allergic to - For a skin test, the doctor puts a drop of the substance that you might be allergic to on your skin and makes a tiny prick in your skin. Then, they watch your skin to see if it gets red and bumpy.   A CT scan of your chest or sinuses - A CT scan is an imaging test that creates pictures of the inside of the body.   Lab tests on a sample of the mucus that you cough up   Using a \"scope\" to look inside of your nose, sinuses, airway, or lungs   Tests to check for acid reflux - These usually involve having a thin tube put in your mouth and down into your esophagus.  How can I care for myself at home?    If your cough is from a cold, you can use a cool mist humidifier in your bedroom.   Suck on cough drops or hard candy.   Drink warm liquids, like tea, to soothe your throat.   Avoid smoking and places where other people are smoking.   If you have allergies, avoid the things that you are allergic to. This might include pollen, dust, animals, or mold.   If you are coughing up mucus, try " "an over-the-counter cold and cough medicine. These medicines can thin mucus and sometimes reduce the urge to cough.   If you have acid reflux, your doctor or nurse will talk to you about how to reduce symptoms.  How is a cough treated? -- Treatment depends on the cause of your cough. For example:   Some infections are treated with antibiotic medicines. If an infection is caused by bacteria, doctors can treat it with antibiotics. If the infection is caused by a virus (such as the common cold), antibiotics will not help. For some viral infections, like the flu or COVID-19, there might be other medicines that can help.   Postnasal drip is treated with different kinds of medicines that can come as a pill or nose spray.   Asthma and COPD are usually treated with medicines that people breathe into their lungs. These are called \"inhaler medicines.\"   Acid reflux can be treated with medicine to reduce or block stomach acid.   If you have a cough as a side effect from an ACE inhibitor, your doctor can switch your medicine.  If the cause of your cough is not clear, your doctor might prescribe medicine to make your cough less severe. But these medicines have side effects, and doctors usually recommend them only if nothing else has worked.  When should I call the doctor? -- Call your doctor or nurse if:   You have trouble breathing or noisy breathing (wheezing).   You have a fever or chest pain.   You cough up blood, or yellow or green mucus.   You cough so hard that it makes you throw up.   Your cough gets worse or lasts longer than 14 days.   You have a cough and have lost weight without trying to.  All topics are updated as new evidence becomes available and our peer review process is complete.  This topic retrieved from [employer] on: Feb 26, 2024.  Topic 72884 Version 16.0  Release: 32.2.4 - C32.56  © 2024 UpToDate, Inc. and/or its affiliates. All rights reserved.  figure 1: Normal lungs     The lungs sit in the chest, " "inside the ribcage. They are covered with a thin membrane called the \"pleura.\" The windpipe, or trachea, branches into 2 smaller airways called the left and right \"bronchi.\" The space between the lungs is called the \"mediastinum.\" Lymph nodes are located within and around the lungs and mediastinum.  Graphic 43177 Version 14.0  Consumer Information Use and Disclaimer   Disclaimer: This generalized information is a limited summary of diagnosis, treatment, and/or medication information. It is not meant to be comprehensive and should be used as a tool to help the user understand and/or assess potential diagnostic and treatment options. It does NOT include all information about conditions, treatments, medications, side effects, or risks that may apply to a specific patient. It is not intended to be medical advice or a substitute for the medical advice, diagnosis, or treatment of a health care provider based on the health care provider's examination and assessment of a patient's specific and unique circumstances. Patients must speak with a health care provider for complete information about their health, medical questions, and treatment options, including any risks or benefits regarding use of medications. This information does not endorse any treatments or medications as safe, effective, or approved for treating a specific patient. UpToDate, Inc. and its affiliates disclaim any warranty or liability relating to this information or the use thereof.The use of this information is governed by the Terms of Use, available at https://www.[employer].com/en/know/clinical-effectiveness-terms. 2024© UpToDate, Inc. and its affiliates and/or licensors. All rights reserved.  Copyright   © 2024 UpToDate, Inc. and/or its affiliates. All rights reserved.  Patient Education     Sinusitis in adults   The Basics   Written by the doctors and editors at [employer]   What is sinusitis? -- Sinusitis is a condition that can cause a stuffy nose, " "pain in the face, and discharge or \"mucus\" from the nose.  The sinuses are hollow areas in the bones of the face (figure 1). They have a thin lining that normally makes a small amount of mucus. When this lining gets irritated or infected, it swells and makes extra mucus. This causes symptoms.  Sinusitis usually happens after a person gets sick with a cold. The germs causing the cold can infect the sinuses, too. Sometimes, other germs can be the cause of the infection. Often, a person feels like their cold is getting better. But then, they get sinusitis and begin to feel sick again.  What are the symptoms of sinusitis? -- Common symptoms of sinusitis include:   Stuffy or blocked nose   Thick white, yellow, or green discharge from the nose   Pain in the teeth   Pain or pressure in the face - This often feels worse when a person bends forward.  People with sinusitis can also have other symptoms, such as:   Fever   Cough   Trouble smelling   Ear pressure or fullness   Headache   Bad breath   Feeling tired  Most of the time, symptoms start to improve in 7 to 10 days.  Should I see a doctor or nurse? -- See your doctor or nurse if your symptoms last more than 10 days, or if your symptoms first get better but then get worse.  Rarely, sinusitis can lead to serious problems. See your doctor or nurse right away (do not wait 10 days) if you have:   Fever higher than 102°F (38.9°C)   Sudden and severe pain in the face and head   Trouble seeing, or seeing double   Trouble thinking clearly   Swelling or redness around 1 or both eyes   Stiff neck  Is there anything I can do on my own to feel better? -- Yes. To help with your symptoms, you can:   Take an over-the-counter pain reliever to reduce the pain.   Rinse your nose and sinuses with salt water a few times a day - Ask your doctor or nurse about the best way to do this.   Drink plenty of fluids - Staying hydrated might help to thin the mucus and make it drain more easily.  Your " "doctor might also recommend a steroid nose spray to reduce the swelling in your nose, especially if you have allergies. Talk to your doctor if you are thinking of using a steroid spray.  How is sinusitis treated? -- Most of the time, sinusitis does not need to be treated with antibiotic medicines. This is because most sinusitis is caused by viruses, not bacteria, and antibiotics do not kill viruses. In fact, even sinusitis caused by bacteria will usually get better on its own without antibiotics.  Some people with sinusitis do need treatment with antibiotics. If your symptoms have not improved after 10 days, ask your doctor if you should take antibiotics. They might recommend that you wait 1 more week to see if your symptoms improve. But if you have symptoms such as a fever or a lot of pain, they might prescribe antibiotics. If you do get antibiotics, follow all of your doctor's instructions about taking them.  What if my symptoms do not get better? -- If your symptoms do not get better, talk with your doctor or nurse. They might order tests to figure out why you still have symptoms. These can include:   CT scan or other imaging tests - Imaging tests create pictures of the inside of the body.   A test to look inside the sinuses - For this test, a doctor puts a thin tube with a camera on the end into the nose and up into the sinuses.  Some people get a lot of sinus infections or have symptoms that last at least 3 months. These people can have a different type of sinusitis called \"chronic sinusitis.\" Chronic sinusitis can be caused by different things. For example, some people have growths inside their nose or sinuses that are called \"polyps.\" Other people have allergies that cause their symptoms.  Chronic sinusitis can be treated in different ways. If you have chronic sinusitis, talk with your doctor about which treatments are right for you.  All topics are updated as new evidence becomes available and our peer " review process is complete.  This topic retrieved from [employer] on: Feb 28, 2024.  Topic 09833 Version 21.0  Release: 32.2.4 - C32.58  © 2024 UpToDate, Inc. and/or its affiliates. All rights reserved.  figure 1: Sinuses of the face     The sinuses are hollow areas in the bones of the face. This drawing shows where the sinuses are, from the side and front views. There are 4 pairs of sinuses, named for the bones around them: sphenoid, frontal, ethmoid, and maxillary.  Graphic 001800 Version 3.0  Consumer Information Use and Disclaimer   Disclaimer: This generalized information is a limited summary of diagnosis, treatment, and/or medication information. It is not meant to be comprehensive and should be used as a tool to help the user understand and/or assess potential diagnostic and treatment options. It does NOT include all information about conditions, treatments, medications, side effects, or risks that may apply to a specific patient. It is not intended to be medical advice or a substitute for the medical advice, diagnosis, or treatment of a health care provider based on the health care provider's examination and assessment of a patient's specific and unique circumstances. Patients must speak with a health care provider for complete information about their health, medical questions, and treatment options, including any risks or benefits regarding use of medications. This information does not endorse any treatments or medications as safe, effective, or approved for treating a specific patient. UpToDate, Inc. and its affiliates disclaim any warranty or liability relating to this information or the use thereof.The use of this information is governed by the Terms of Use, available at https://www.[employer].com/en/know/clinical-effectiveness-terms. 2024© UpToDate, Inc. and its affiliates and/or licensors. All rights reserved.  Copyright   © 2024 UpToDate, Inc. and/or its affiliates. All rights reserved.    Patient  "Education     Ear infections in adults   The Basics   Written by the doctors and editors at [facility]   What is an ear infection? -- An ear infection is a condition that can cause pain in the ear, fever, and trouble hearing. Ear infections are more common in children than in adults.  Ear infections often occur after a cold or problem with seasonal allergies. Ear infections in adults happen more often in people who have a problem with their Eustachian tubes. The Eustachian tube connects the middle ear (the part of the ear behind the eardrum) to the back of the nose and throat.  Fluid can build up in the middle part of the ear behind the eardrum. This fluid can become infected and press on the eardrum, causing it to bulge (figure 1). This causes symptoms.  The medical term for middle ear infections is \"otitis media.\"  What are the symptoms of an ear infection? -- In adults, the symptoms include:   Ear pain   Temporary hearing loss   Feeling dizzy  How do I know if I have an ear infection? -- If you think that you have an ear infection, see a doctor or nurse. They will ask you about your symptoms, do an exam, and look in your ears.  How is an ear infection treated? -- Doctors treat ear infections in adults with antibiotics. These medicines kill the bacteria that cause some ear infections. Even though some ear infections are caused by a virus, doctors often prescribe antibiotics for adults anyway. That's because ear infections can lead to other problems if they are not treated quickly.  Is there anything I can do on my own to feel better?    Take all of your medicines as instructed. If the doctor prescribes antibiotics, finish all of them, even if you start to feel better.   You can take non-prescription medicines to relieve pain. Examples include acetaminophen (sample brand name: Tylenol), ibuprofen (sample brand names: Advil, Motrin), or naproxen (sample brand name: Aleve).   You can try ice or heat to help with ear " "pain. Do not sleep with ice or heat on your ear.   Put a cold gel pack, bag of ice, or bag of frozen vegetables on the ear every 1 to 2 hours, for 15 minutes each time. Put a thin towel between the ice (or other cold object) and the skin.   Put a heating pad, warm towel, or hot water bottle on the ear every 1 to 2 hours, for 15 minutes at a time. Put a thin towel between the warm object and the skin.   Do not put anything in your ear unless the doctor or nurse told you to.   Airplane travel can make ear pain worse, especially as the plane starts to land. Chewing gum, drinking, or eating food might help.  Can ear infections be prevented? -- To lower your risk of getting an ear infection:   If you smoke, try to stop. Avoid places where others are smoking.   Wash your hands often.   Stay away from others who are sick with a cold or viral infection.   Get all of the vaccines your doctor recommends.  When should I call the doctor? -- Call for advice if:   Your symptoms are not getting better in 2 to 3 days.   You continue to have hearing problems after 2 to 3 weeks.   You have a fever of 100.4°F (38°C) or higher, or chills.   You have discharge or drainage coming from your ear.  All topics are updated as new evidence becomes available and our peer review process is complete.  This topic retrieved from [employer] on: May 15, 2024.  Topic 982170 Version 1.0  Release: 32.4.3 - C32.134  © 2024 UpToDate, Inc. and/or its affiliates. All rights reserved.  figure 1: Ear infection (otitis media)     The ear on the left is normal and does not have an infection. The ear on the right shows what an infection can look like. The infected fluid in the middle ear causes the eardrum to bulge. Normally, fluid in the middle ear drains into the throat through a tube called the \"Eustachian tube.\" But during an infection, swelling blocks off the tube, so fluid builds up.  Graphic 77500 Version 8.0  Consumer Information Use and Disclaimer "   Disclaimer: This generalized information is a limited summary of diagnosis, treatment, and/or medication information. It is not meant to be comprehensive and should be used as a tool to help the user understand and/or assess potential diagnostic and treatment options. It does NOT include all information about conditions, treatments, medications, side effects, or risks that may apply to a specific patient. It is not intended to be medical advice or a substitute for the medical advice, diagnosis, or treatment of a health care provider based on the health care provider's examination and assessment of a patient's specific and unique circumstances. Patients must speak with a health care provider for complete information about their health, medical questions, and treatment options, including any risks or benefits regarding use of medications. This information does not endorse any treatments or medications as safe, effective, or approved for treating a specific patient. UpToDate, Inc. and its affiliates disclaim any warranty or liability relating to this information or the use thereof.The use of this information is governed by the Terms of Use, available at https://www.[employer].com/en/know/clinical-effectiveness-terms. 2024© UpToDate, Inc. and its affiliates and/or licensors. All rights reserved.  Copyright   © 2024 UpToDate, Inc. and/or its affiliates. All rights reserved.